# Patient Record
Sex: MALE | Race: WHITE
[De-identification: names, ages, dates, MRNs, and addresses within clinical notes are randomized per-mention and may not be internally consistent; named-entity substitution may affect disease eponyms.]

---

## 2017-04-17 ENCOUNTER — HOSPITAL ENCOUNTER (OUTPATIENT)
Dept: HOSPITAL 39 - GMAB | Age: 67
Discharge: HOME | End: 2017-04-17
Attending: FAMILY MEDICINE
Payer: COMMERCIAL

## 2017-04-17 DIAGNOSIS — M25.511: Primary | ICD-10-CM

## 2018-04-25 ENCOUNTER — HOSPITAL ENCOUNTER (OUTPATIENT)
Dept: HOSPITAL 39 - GMAB | Age: 68
End: 2018-04-25
Attending: FAMILY MEDICINE
Payer: COMMERCIAL

## 2018-04-25 DIAGNOSIS — R53.82: ICD-10-CM

## 2018-04-25 DIAGNOSIS — Z12.5: ICD-10-CM

## 2018-04-25 DIAGNOSIS — I10: Primary | ICD-10-CM

## 2018-12-20 ENCOUNTER — HOSPITAL ENCOUNTER (OUTPATIENT)
Dept: HOSPITAL 39 - CT | Age: 68
End: 2018-12-20
Attending: FAMILY MEDICINE
Payer: COMMERCIAL

## 2018-12-20 DIAGNOSIS — K43.9: Primary | ICD-10-CM

## 2018-12-20 NOTE — CT
EXAM DESCRIPTION: Abdomen w/o Contrast



CLINICAL HISTORY: 68 years Male, VENTRAL HERNIA WITHOUT

OBSTRUCTION OF GANGRENE



COMPARISON: None.



FINDINGS: 2.5 mm helical CT scanning through the upper abdomen

without contrast



Heart size is normal. Lower lungs are clear.



In the upper abdomen, unenhanced images of the liver, spleen,

pancreas, gallbladder, adrenal glands and kidneys are

unremarkable. No hydronephrosis or renal stones. No calcified

gallstones. No inflammation around the pancreas.



Small hiatal hernia is present. Stomach is otherwise

unremarkable. Moderate amount of fecal material in the colon.

Small ventral hernia contains omental fat.



Lower pelvis is not included on the study. Mid and lower

abdominal bowel loops above the pelvic level are unremarkable

with no dilatation to suggest obstruction. Ventral hernia defect

along the medial aspect of the right rectus abdominis muscle

measures approximately nine mm. The herniated fat in the anterior

abdominal wall measures approximately 2.7 cm. Additional small

ventral bulge of omental fat is seen more inferiorly related to

previous supraumbilical surgery.



Orthopedic hardware is seen in the lower lumbar spine with

previous complete L5 laminectomy. L4 laminectomy. There is

leftward degenerative curvature of the lumbar spine.



Coronal and sagittal reformatted images confirm the findings.



IMPRESSION:



Small ventral hernias containing omental fat.



No acute upper abdominal process.



Degenerative levoscoliosis of lumbar spine.





This exam was performed according to our departmental

dose-optimization program, which includes automated exposure

control, adjustment of the mA and/or kV according to patient size

and/or use of iterative reconstruction technique.



Electronically signed by:  Elliott Aguirre MD  12/20/2018 10:00

AM [facility] Workstation: [id number]

## 2019-03-05 NOTE — RAD
EXAM DESCRIPTION: Chest,2 Views



CLINICAL HISTORY: preop surgery



COMPARISON: Previous chest x-ray December 18, 2018



TECHNIQUE: PA/lateral



FINDINGS: Lungs appear slightly less hyperexpanded than on the

previous study. Heart size is normal with normal pulmonary

vascularity. No pleural effusion or pneumothorax. Lungs are clear

with no consolidating infiltrate. Lateral view shows intact

sternum and T-spine.



IMPRESSION:



No acute process is identified in the chest.



Electronically signed by:  Elliott Aguirre MD  3/5/2019 1:36 PM

[facility] Workstation: [id number]

## 2019-03-07 ENCOUNTER — HOSPITAL ENCOUNTER (OUTPATIENT)
Dept: HOSPITAL 39 - AMB | Age: 69
LOS: 3 days | Discharge: HOME | End: 2019-03-10
Attending: SURGERY
Payer: MEDICARE

## 2019-03-07 VITALS — OXYGEN SATURATION: 96 % | DIASTOLIC BLOOD PRESSURE: 73 MMHG | SYSTOLIC BLOOD PRESSURE: 159 MMHG | TEMPERATURE: 98 F

## 2019-03-07 DIAGNOSIS — K43.9: Primary | ICD-10-CM

## 2019-03-07 DIAGNOSIS — Z87.891: ICD-10-CM

## 2019-03-07 DIAGNOSIS — I10: ICD-10-CM

## 2019-03-07 DIAGNOSIS — Z79.899: ICD-10-CM

## 2019-03-07 DIAGNOSIS — K21.9: ICD-10-CM

## 2019-03-07 DIAGNOSIS — K42.9: ICD-10-CM

## 2019-03-07 DIAGNOSIS — N40.0: ICD-10-CM

## 2019-03-07 PROCEDURE — 49585: CPT

## 2019-03-07 PROCEDURE — 93005 ELECTROCARDIOGRAM TRACING: CPT

## 2019-03-07 PROCEDURE — 71046 X-RAY EXAM CHEST 2 VIEWS: CPT

## 2019-03-07 PROCEDURE — 85025 COMPLETE CBC W/AUTO DIFF WBC: CPT

## 2019-03-07 PROCEDURE — 49560: CPT

## 2019-03-07 PROCEDURE — 36416 COLLJ CAPILLARY BLOOD SPEC: CPT

## 2019-03-07 PROCEDURE — 81001 URINALYSIS AUTO W/SCOPE: CPT

## 2019-03-07 PROCEDURE — 00830 ANES HERNIA RPR LWR ABD NOS: CPT

## 2019-03-07 PROCEDURE — 80053 COMPREHEN METABOLIC PANEL: CPT

## 2019-03-07 RX ADMIN — HYDROMORPHONE HYDROCHLORIDE ONE MG: 2 INJECTION, SOLUTION INTRAMUSCULAR; INTRAVENOUS; SUBCUTANEOUS at 10:35

## 2019-03-07 RX ADMIN — HYDROMORPHONE HYDROCHLORIDE ONE MG: 2 INJECTION, SOLUTION INTRAMUSCULAR; INTRAVENOUS; SUBCUTANEOUS at 10:45

## 2019-03-07 RX ADMIN — HYDROMORPHONE HYDROCHLORIDE ONE MG: 2 INJECTION, SOLUTION INTRAMUSCULAR; INTRAVENOUS; SUBCUTANEOUS at 10:55

## 2019-03-07 NOTE — OP
DATE OF PROCEDURE:  03/07/19



PREOPERATIVE DIAGNOSIS: 

1.  Ventral hernia.



POSTOPERATIVE DIAGNOSIS: 

1.  Ventral incisional hernia.

2.  Umbilical hernia.



PROCEDURE: 

1.  Repair of ventral incisional hernia and umbilical hernia.



SURGEON:  Donald A. Behr, MD.



ASSISTANT:  None.



ANESTHESIA:  General laryngeal mask anesthesia by Anesthesia and local 
infiltration of 1% lidocaine with bicarb.



INDICATION:  The patient is a 68-year-old male who had had some discomfort in 
his midline.  A CT revealed the hernia above umbilicus and one at his umbilicus 
which was not identified on the original reading of the CT scan.  The patient 
was brought to the Surgical Suite today for repair of same after the risks, 
benefits and alternatives to the procedure were discussed and accepted.



FINDINGS:  The patient was found to have a little over 1 cm defect above the 
umbilicus to the right side of the midline which appeared to be a tear from 
suture.  There were also two very small defects, less than 5 mm, one on the left
side just below the one above the umbilicus and there was another one just to 
the right of the midline above the umbilical defect which was approximately 12 
mm in length.  All the hernias had preperitoneal fat or omentum.



PROCEDURE:  After the patient was placed in the supine position with the head of
the bed elevated, he underwent general laryngeal mask anesthesia and was then 
prepped and draped in the usual sterile manner.  A surgical time-out was taken 
noting that he had received perioperative antibiotics.  A midline incision was 
then made involving the previous incision from the umbilicus up and several 
centimeters above the previous incision.  The incision was made first with local
infiltration of anesthesia and then a sharp knife.  Dissection was then carried 
down through the skin and subcutaneous tissue to the midline fascia using 
electrocautery and blunt dissection.  Two larger defects were identified.  The 
fatty tissue within them was dissected free and reduced below the level of the 
fascia.  When this was done, the previous scar tissue and subcutaneous fat were 
dissected free from the midline fascia.  The two other smaller defects were 
identified.  They were both closed with a single figure-of-eight of 2-0 Prolene.
 The two larger defects, when they had been cleaned of subcutaneous fat, were 
then closed with interrupted figure-of-eight sutures of #1 PDS that were placed 
sequentially and then tightened and tied sequentially.  When these two repairs 
had been done, the wound was irrigated with saline.  All dead fat was removed.  
Hemostasis was noted to be adequate.  At this point, a 5 by 10 cm piece of 
Surgimesh with the edges trimmed at the corners was sutured over all of the 
repairs with interrupted 2-0 Prolene and 2-0 Vicryl sutures.  Again, the wound 
was irrigated with saline.  The umbilicus was then sutured down to the deeper 
aspect of the subcutaneous fat and the subcutaneous tissue was reapproximated 
with 2-0 and then 3-0 Vicryl sutures.  The skin edges were approximated with a 
skin stapler.  A sterile pressure dressing was applied, an abdominal binder was 
applied and the patient was awakened and taken to the Recovery Room in good and 
stable condition.  Estimated blood loss was less than 75 mL.  All sponge, needle
and instrument counts were correct.



#[id number]

[facility]

## 2019-06-26 ENCOUNTER — HOSPITAL ENCOUNTER (OUTPATIENT)
Dept: HOSPITAL 39 - MRI | Age: 69
End: 2019-06-26
Attending: FAMILY MEDICINE
Payer: MEDICARE

## 2019-06-26 DIAGNOSIS — M51.37: ICD-10-CM

## 2019-06-26 DIAGNOSIS — M47.896: ICD-10-CM

## 2019-06-26 DIAGNOSIS — M43.16: ICD-10-CM

## 2019-06-26 DIAGNOSIS — Z98.1: ICD-10-CM

## 2019-06-26 DIAGNOSIS — M48.062: ICD-10-CM

## 2019-06-26 DIAGNOSIS — M94.252: Primary | ICD-10-CM

## 2019-06-26 DIAGNOSIS — N40.0: ICD-10-CM

## 2019-06-27 NOTE — MRI
Study: MRI of the Left Hip. 



Indication: PAIN IN LEFT HIP



Technique: Multiplanar, multi sequence MRI of the left hip was

obtained without intravenous contrast. 



Comparison: None.



Findings:



Grade 3 chondral thinning throughout the majority of the left hip

joint and most pronounced posteriorly where this likely

additional grade 4 chondral loss. No acute fracture or

osteonecrosis. Tiny left hip effusion noted. Undersurface tearing

of the anterior superior and superior left hip labrum suspected.



The bilateral hips are significantly externally rotated. This

results in marked narrowing of the bilateral quadratus femoris

spaces to less than 3 mm with associated intramuscular edema.

These changes can be seen with ischiofemoral impingement.

Tendinosis bilateral hamstring tendon origins. Tendinosis

bilateral gluteus minimus/medius tendon insertions with mild

bilateral greater trochanter bursal edema. No acute high grade

pelvic tendon tear.



Mild prostatomegaly. Mild circumferential bladder wall

thickening. Degenerative and post surgical changes lower lumbar

spine.



Impression:



Areas of grade 3 and mild grade 4 chondrosis of the left hip

joint with a tiny joint effusion as well as labral degeneration.

No acute fracture or osteonecrosis.



Findings which can be seen with bilateral ischiofemoral

impingement.



Prostatomegaly and circumferential bladder wall thickening,

likely chronic bladder outlet obstruction but nonspecific.

Correlation with PSA as well as urinalysis and urine cultures

recommended.



Additional findings as above.





Electronically signed by:  Raleigh Barragan MD  6/27/2019 7:51 AM CDT

Workstation: [id number]

## 2019-06-27 NOTE — MRI
EXAM DESCRIPTION: 

Lumbar Spine w/o Contrast : Magnetic Resonance Imaging.



CLINICAL HISTORY: 

LOW BACK PAIN



COMPARISON: 

Left hip radiographs 6/26/2019. Sacral radiographs and SI joints

10/29/2018.



TECHNIQUE: 

Multiplanar, multiple standard sequences, non contrast MRI,

lumbar spine.



FINDINGS: 

L5-S1: Moderate disc space loss with desiccation of the disc.

Posterior tiny midline bulge. Bilateral hypertrophic facet

arthrosis and thickening of the flavum ligament more on the left.

Moderate to severe stenosis left foramen and borderline right

foraminal stenosis. Canal is patent.



Posterior bilateral L4-L5 fusion construct with unilateral

connecting rods posteriorly. No surrounding edema soft tissue

mass or fluid collection. Normal signal in the marrow of the

vertebral bodies, taking into account magnetic susceptibility

artifact. Posterior decompression at L4 and L5. Interbody fusion

device in the L4-L5 disc space. Canal is patent with no fluid or

soft tissue mass in the canal. Moderate narrowing of the

bilateral foramina.



L3-L4: Marked loss of the disc space with hyperintense T2 and

STIR signal in the disc space. Posterior endplate osteophyte

bulge into the canal. Bilateral hypertrophic facet arthrosis and

thickening of the ligaments more severe on the left. AP canal

diameter 9 cm. Partial effacement of the left subarticular recess

with possible compromise left L4 nerve root. Moderate endplate

reactive changes bilaterally more left than right. Minimal

anterior soft tissue edema. Left foramen and mild narrowing.

Right foraminal stenosis.



L2-L3: Severe disc space loss with grade 1 4 mm retrolisthesis.

Canal is patent. Advanced spondylosis on the right with disc

osteophyte complex encroaching on the right foramen. Moderate

narrowing of the left foramen. Mild bilateral facet arthrosis

with bilateral ligament thickening.



L1-L2: Partial fusion of the anterior and right lateral disc

space and severe disc space loss posterior to the left. Moderate

to severe narrowing of the right foramen and left foramen is

patent. Canal is patent. Circumscribed hyperintense signal lesion

in the L1 vertebral body on T1 and T2 sequences consistent with a

hemangioma.



T12-L1: Minimal disc space loss with desiccation of the disc.

Minimal posterior disc bulge. Mild narrowing of the left foramen.

Canal and right foramen are patent. Minimal spondylosis on the

left side of the disc space. Conus terminates just below the disc

space. Lesion in the T11 vertebral body similar appearance to the

hemangioma at L1.



L1-L4 levoscoliosis.  Paravertebral soft tissues minimal edema

abutting the L3-L4 endplate. No large soft tissue mass.. Normal

marrow signal in the remaining vertebral bodies and the posterior

elements. Vertebral bodies are not compressed at any level.



IMPRESSION: 

1. Severe disc space loss at L3-L4 with fluid signal in the disc

space and edematous endplate reactive changes posterior to the

left of midline. Grade 1 retrolisthesis. Minimal paravertebral

edema. Consider follow-up scan with and without gadolinium IV

contrast to evaluate for infectious discitis. Right foraminal

stenosis. Mild left paracentral canal stenosis. Osteophyte

encroachment on the left subarticular recess with possible

compromise left L4 nerve.

2. Posterior bilateral L4-L5 fusion construct with unilateral

connecting rods posteriorly. Previous decompression. No canal or

foraminal stenosis. No complications. L2-L3 advanced spondylosis

on the right with disc osteophyte complex encroaching on the

right foramen.

3. Disc space loss and desiccation L5-S1. Moderate to severe

stenosis left foramen and borderline mild right foraminal

stenosis. Correlate for L5 radiculopathy.

4. Grade 1 retrolisthesis L2-L3. Advanced spondylosis on the

right with disc osteophyte complex encroaching on the right

foramen. Mild stenosis. Possible compromise right L2 nerve.

5. L1-L2: Partial fusion of the anterior right lateral disc space

and severe disc space posterior and left. Moderate to severe

narrowing of the right foramen with possible compromise right L1

nerve.



Electronically signed by:  Anthony Gifford MD  6/27/2019 8:53 AM CDT

Workstation: [id number]

## 2019-07-02 ENCOUNTER — HOSPITAL ENCOUNTER (OUTPATIENT)
Dept: HOSPITAL 39 - MRI | Age: 69
End: 2019-07-02
Attending: FAMILY MEDICINE
Payer: MEDICARE

## 2019-07-02 DIAGNOSIS — I10: ICD-10-CM

## 2019-07-02 DIAGNOSIS — M47.896: Primary | ICD-10-CM

## 2019-07-02 NOTE — MRI
EXAM DESCRIPTION: 

Lumbar Spine w/wo Contrast: Magnetic Resonance Imaging.



CLINICAL HISTORY: 

Low back pain. Evaluate for discitis versus severe spondylosis

L3-L4.



COMPARISON: 

None Available.



TECHNIQUE: 

Multiplanar, MRI, multiple standard sequences, without and with

standard dose Gadolinium IV contrast, lumbar spine. No adverse

reactions.



FINDINGS: 

L3-L4: Again noted is marked disc space loss with hyperintense

signal in the disc space, before contrast. Marked endplate

reactive changes in the mid disc space and to the right of

midline along with hypertrophy of the left facet and ligaments.

After IV gadolinium was given, no abnormal enhancement in the

endplates or the disc space. No paravertebral enhancement. Normal

enhancement in the thecal sac. Canal and foraminal stenosis is

again noted.



No abnormal contrast enhancement in the bone or soft tissues

around the posterior fusion construct. No abnormal contrast

enhancement in the thecal sac or foramina at any level. No

abnormal contrast enhancement in the other disc spaces with

spondylosis of the endplates at some levels showing minimal

enhancement. Variable enhancement in the hemangiomas seen on the

prior study.



IMPRESSION: 

1. No MRI evidence of discitis at the L3-L4 level without and

with gadolinium IV contrast. Minimal contrast enhancement at some

levels from the spondylosis.

2. No abnormal enhancement around the fusion construct hardware

and bony structures. No abnormal enhancement in the spinal canal.



Electronically signed by:  Anthony Gifford MD  7/2/2019 5:08 PM CDT

Workstation: [id number]

## 2019-09-03 ENCOUNTER — HOSPITAL ENCOUNTER (OUTPATIENT)
Dept: HOSPITAL 39 - GMAE | Age: 69
End: 2019-09-03
Attending: FAMILY MEDICINE
Payer: MEDICARE

## 2019-09-03 DIAGNOSIS — N40.0: Primary | ICD-10-CM

## 2019-09-19 ENCOUNTER — HOSPITAL ENCOUNTER (OUTPATIENT)
Dept: HOSPITAL 39 - RAD | Age: 69
End: 2019-09-19
Attending: ORTHOPAEDIC SURGERY
Payer: MEDICARE

## 2019-09-19 DIAGNOSIS — M85.88: ICD-10-CM

## 2019-09-19 DIAGNOSIS — M25.552: Primary | ICD-10-CM

## 2019-09-20 NOTE — RAD
EXAM DESCRIPTION: 

Pelvis: CR/DR/XR



CLINICAL HISTORY: 

LEFT HIP PAIN



COMPARISON: 

Pelvis radiographs on the same visit. MRI lumbar spine July 2, 2019.



TECHNIQUE: 

One view



FINDINGS: 

No fracture dislocation. Decreased bone density. No left hip

joint space narrowing or dislocation. Symmetric bilaterally.

Range of motion unremarkable. No abnormal radiodense objects in

the soft tissues or joint spaces. Vascular clips in the soft

tissues overlying the upper medial left thigh. Posterior fusion

construct lower lumbar spine.



IMPRESSION: 

Decreased bone density. Bilateral hip joints appear symmetric. No

acute bony or joint margin abnormality.



Electronically signed by:  Anthony Gifford MD  9/20/2019 11:49 AM

CDT Workstation: [id number]

## 2019-09-20 NOTE — RAD
EXAM DESCRIPTION: 

Left hip: CR/DR/XR



CLINICAL HISTORY: 

LEFT HIP PAIN



COMPARISON: 

Pelvis radiographs on the same visit. MRI lumbar spine July 2, 2019.



TECHNIQUE: 

One view



FINDINGS: 

No fracture dislocation. Decreased bone density. No left hip

joint space narrowing or dislocation. Symmetric bilaterally.

Range of motion unremarkable. No abnormal radiodense objects in

the soft tissues or joint spaces. Vascular clips in the soft

tissues overlying the upper medial left thigh. Posterior fusion

construct lower lumbar spine.



IMPRESSION: 

Decreased bone density. Bilateral hip joints appear symmetric. No

acute bony or joint margin abnormality.



Electronically signed by:  Anthony Gifford MD  9/20/2019 11:50 AM

CDT Workstation: [id number]

## 2020-03-10 ENCOUNTER — HOSPITAL ENCOUNTER (OUTPATIENT)
Age: 70
End: 2020-03-10
Payer: MEDICARE

## 2020-03-10 DIAGNOSIS — Z13.89: Primary | ICD-10-CM

## 2020-07-02 ENCOUNTER — HOSPITAL ENCOUNTER (OUTPATIENT)
Dept: HOSPITAL 39 - MRI | Age: 70
End: 2020-07-02
Attending: PAIN MEDICINE
Payer: MEDICARE

## 2020-07-02 DIAGNOSIS — M47.896: ICD-10-CM

## 2020-07-02 DIAGNOSIS — Z98.1: ICD-10-CM

## 2020-07-02 DIAGNOSIS — M46.1: Primary | ICD-10-CM

## 2020-07-02 DIAGNOSIS — M48.061: ICD-10-CM

## 2020-07-02 NOTE — MRI
EXAM DESCRIPTION: 

Lumbar Spine w/o Contrast : Magnetic Resonance Imaging.



CLINICAL HISTORY: 

SACROILIITIS



COMPARISON: 

MRI lumbar spine with and without gadolinium IV contrast June and

July 2019.



TECHNIQUE: 

Multiplanar, multiple standard sequences, non contrast MRI,

lumbar spine.



FINDINGS: 

L5-S1: The disc is well visualized on axial T2 series 501, image

3. Moderate disc space loss hypertrophic changes in the facet

joints and posterior flavum ligaments (canal elements). Prior

partial decompression. Minimal flattening of the left

subarticular recess. Borderline right foraminal stenosis and

advanced left foraminal stenosis. No change from the prior study.



Again noted is bilateral posterior transpedicular L4-L5 fusion

construct. Interbody fusion device. No abnormal soft tissue edema

or fluid collections around the hardware. No interval change from

the prior study. Posterior decompression and canal patent.

Partial fusion of facet joints. Moderate right foraminal

narrowing and moderate to severe left foraminal narrowing.



L3-L4: Severe disc space loss and endplate reactive changes

advanced in the midline into the left of midline. 6.5 mm grade 1

retrolisthesis of the disc remnant and inferior L3 spur. Moderate

hypertrophic changes in the canal elements more on the left. AP

canal diameter 8.5 mm. Effacement of the left subarticular recess

with compromise of the left L4 nerve. Narrowing right

subarticular recess. Severe right foraminal stenosis and

borderline left foraminal stenosis. This has progressed since the

prior study.



L2-L3: Severe disc space loss with endplate reactive changes in

the midline into the right of midline. Disc osteophyte complex

encroachment on the right foramen with stenosis and possible

compromise right L2 nerve. Grade 1 Retrolisthesis 4.5 mm. Disc

remnant and spur encroaching on the canal. Bilateral subarticular

recess narrowing or stenosis with possible compromise bilateral

L3 nerves. Mild hypertrophic changes in the canal elements with

no canal stenosis. Severe left foraminal narrowing.



L1-2 again noted is severe disc space loss and fusion of the disc

space to the right of midline and anterior. Trace

anterolisthesis. Minimal anterior height loss L2 vertebral body.

Mild hypertrophic changes canal elements. Right foraminal

stenosis has progressed since the prior study with mild left

foraminal narrowing. Circumscribed hyperintense T1 and T2

hemangioma in the L1 vertebral body and in the T11 vertebral

body.



T12-L1: Disc space loss with endplate reactive changes mostly in

the midline into the left of midline. Anterior and posterior

endplate bulging along with the disc. Moderate to severe left

foraminal narrowing with mild to moderate right foraminal

narrowing. Conus terminates at this level.



L1-L4 levoscoliosis again noted.  Paravertebral soft tissues

surgical changes inferiorly with possible atrophy at other

levels. Possible cyst left kidney.. Distal cord normal signal and

caliber.  Otherwise normal marrow signal in the remaining

vertebral bodies and the posterior elements. Vertebral bodies are

not compressed at any level.. 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

S1-S2 stable since the prior study.



IMPRESSION: 

1. Posterior transpedicular spinal fusion bilaterally L4-L5,

stable position and alignment with no complications.

2. Severe right foraminal stenosis and borderline left foraminal

stenosis at L3-L4 has progressed since the prior study.

3. Bilateral Subarticular recess severe narrowing/stenosis at

L2-L3 with possible compromise of the bilateral L3 nerves has

progressed since the prior study. Right foraminal stenosis and

encroachment on the right L2 nerve has progressed since the prior

study.

4. Right foraminal stenosis at L1-L2 has progressed since the

prior study.



Electronically signed by:  Anthony Gifford MD  7/2/2020 9:57 AM CDT

Workstation: [id number]

## 2020-08-04 ENCOUNTER — HOSPITAL ENCOUNTER (EMERGENCY)
Dept: HOSPITAL 39 - ER | Age: 70
Discharge: HOME | End: 2020-08-04
Payer: MEDICARE

## 2020-08-04 VITALS — DIASTOLIC BLOOD PRESSURE: 86 MMHG | SYSTOLIC BLOOD PRESSURE: 145 MMHG | OXYGEN SATURATION: 97 % | TEMPERATURE: 98.1 F

## 2020-08-04 DIAGNOSIS — S40.261A: Primary | ICD-10-CM
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
## 2020-08-04 DIAGNOSIS — I11.0: ICD-10-CM

## 2020-08-04 DIAGNOSIS — R20.0: ICD-10-CM

## 2020-08-04 DIAGNOSIS — I50.9: ICD-10-CM

## 2020-08-04 DIAGNOSIS — Z79.899: ICD-10-CM

## 2020-08-04 NOTE — ED.PDOC
History of Present Illness





- General


Chief Complaint: Bite: Animal/Insect/Human


Time Seen by Provider: 08/04/20 11:19


Source: patient


Exam Limitations: no limitations





- History of Present Illness


Initial Comments: 





Got bitten today on R shoulder in his barn.  HE DID NOT SEE THE INSECT AND AT 

FIRST IT JUST FELT LIKE A SPLINTER.  NO BITE MARKS VISIBLE AT THE TIME.  HE 

STARTED SENSING A NUMBNESS OF HIS TONGUE, LIPS, AND TEETH AND FELT SLIGHTLY 

LIGHT HEADED.  NO SOB.  


Timing/Duration: 1-3 hours


Severity: moderate


Improving Factors: nothing


Worsening Factors: nothing


Associated Symptoms: denies symptoms


Allergies/Adverse Reactions: 


Allergies





NO KNOWN ALLERGY Allergy (Verified 08/04/20 11:25)


   





Home Medications: 


Ambulatory Orders





Amlodipine Besylate 5 mg PO DAILY 03/05/19 


Finasteride [Proscar] 5 mg PO DAILY 03/05/19 


Lisinopril 20 mg PO DAILY 03/05/19 


Rosuvastatin Calcium [Crestor] 10 mg PO DAILY 03/05/19 


Pantoprazole Sodium 40 mg PO DAILY 08/04/20 











Review of Systems





- Review of Systems


Constitutional: Denies: chills, fever


EENTM: Denies: eye pain, blurred vision, ear pain, nose pain, throat pain, 

throat swelling, mouth pain, mouth swelling


Respiratory: Denies: cough, short of breath


Cardiology: Denies: chest pain, palpitations


Gastrointestinal/Abdominal: Denies: abdominal pain, nausea, vomiting


Genitourinary: States: no symptoms reported


Musculoskeletal: Denies: back pain, joint pain, joint swelling, muscle pain, 

muscle stiffness, neck pain


Skin: Denies: change in color, lesions, rash


Neurological: States: tingling - TINGLING OF MOUTH, PER HPI.  .  Denies: h

eadache, numbness, tremors, weakness


Endocrine: Denies: excessive sweating, flushing


Hematologic/Lymphatic: Denies: easy bleeding, easy bruising, swollen glands


All other Systems: Reviewed and Negative





Past Medical History (General)





- Patient Medical History


Hx Seizures: No


Hx Stroke: No


Hx Dementia: No


Hx Asthma: No


Hx of COPD: No


Hx Cardiac Disorders: No


Hx Congestive Heart Failure: Yes


Hx Pacemaker: No


Hx Hypertension: Yes


Hx Thyroid Disease: No


Hx Diabetes: No


Hx Renal Disease: No


Hx Cancer: No


Hx Hepatitis C: No


Hx MRSA: No


Surgical History: no surgical history





- Vaccination History


Hx Tetanus, Diphtheria Vaccination: Yes


Hx Influenza Vaccination: Yes


Hx Pneumococcal Vaccination: Yes





- Social History


Hx Tobacco Use: Yes


Hx Alcohol Use: No





- Female History


Patient is a Female of Child Bearing Age (10 -59 yrs old): No





Family Medical History





- Family History


  ** Mother


Family History: Unknown


Living Status: Unknown





Physical Exam





- Physical Exam


General Appearance: Alert, No apparent distress


Eye Exam: bilateral normal


Ears, Nose, Throat: hearing grossly normal, normal ENT inspection


Neck: non-tender, full range of motion


Respiratory: lungs clear, normal breath sounds


Cardiovascular/Chest: normal peripheral pulses, regular rate, rhythm, no JVD


Peripheral Pulses: radial,right: 2+, radial,left: 2+


Gastrointestinal/Abdominal: normal bowel sounds, non tender, soft, no 

organomegaly


Back Exam: normal inspection, no vertebral tenderness


Extremity: normal range of motion, non-tender, normal inspection, no pedal 

edema, no calf tenderness


Neurologic: CNs II-XII nml as tested, no motor/sensory deficits, alert, normal 

mood/affect, oriented x 3


Skin Exam: normal color, warm/dry, other - NO LESION OR BITE MEJIAS SEEN ON 

SHOULDER.  


Lymphatic: no adenopathy





Progress





- Results/Orders


Results/Orders: 


PT WAS CONCERNED IT WAS A BLACK  BITE, HOWEVER HE HAS NO CLASSIC SX (NO 

TREMOR, WEAKNESS, MUSCLE PAIN, ABD PAIN, N/V, HA). 


HE HAD PERIORAL PERISTHESIAS (BUT NO RESPIRATORY COMPROMISE/SX).  HE WAS 

OBSERVED IN ED OVER 1 HR AND HIS PERIORAL SX RESOLVED, THUS SAFE FOR DC TO HOME.

  RETURN PRECAUTIONS GIVEN.  





- EKG/XRAY/CT


CT Ordered: No


CT Interpretation Call Back: No





Departure





- Departure


Clinical Impression: 


 Numbness around mouth





Insect bite of shoulder, right


Qualifiers:


 Encounter type: initial encounter Qualified Code(s): S40.261A - Insect bite 

(nonvenomous) of right shoulder, initial encounter





Disposition: Discharge to Home or Self Care


Condition: Good


Departure Forms:  ED Discharge - Pt. Copy, Patient Portal Self Enrollment


Instructions:  DI for Animal Bites


Diet: resume usual diet


Activity: increase activity as tolerated


Referrals: 


CHER POWELL MD [Primary Care Provider] - 1-2 Weeks


Home Medications: 


Ambulatory Orders





Amlodipine Besylate 5 mg PO DAILY 03/05/19 


Finasteride [Proscar] 5 mg PO DAILY 03/05/19 


Lisinopril 20 mg PO DAILY 03/05/19 


Rosuvastatin Calcium [Crestor] 10 mg PO DAILY 03/05/19 


Pantoprazole Sodium 40 mg PO DAILY 08/04/20 








Additional Instructions: 


Please return to the ER if you start noticing any new symptoms, especially any 

difficulty breathing.